# Patient Record
Sex: MALE | Race: WHITE | URBAN - METROPOLITAN AREA
[De-identification: names, ages, dates, MRNs, and addresses within clinical notes are randomized per-mention and may not be internally consistent; named-entity substitution may affect disease eponyms.]

---

## 2022-03-07 ENCOUNTER — OFFICE VISIT (OUTPATIENT)
Dept: ORTHOPEDIC SURGERY | Age: 47
End: 2022-03-07
Payer: COMMERCIAL

## 2022-03-07 VITALS — HEIGHT: 64 IN | WEIGHT: 228 LBS | BODY MASS INDEX: 38.93 KG/M2

## 2022-03-07 DIAGNOSIS — M77.8 TENDINITIS OF LEFT WRIST: ICD-10-CM

## 2022-03-07 DIAGNOSIS — M25.532 LEFT WRIST PAIN: Primary | ICD-10-CM

## 2022-03-07 PROCEDURE — 99203 OFFICE O/P NEW LOW 30 MIN: CPT | Performed by: ORTHOPAEDIC SURGERY

## 2022-03-07 RX ORDER — QUINAPRIL 40 MG/1
TABLET ORAL
COMMUNITY
Start: 2022-02-16

## 2022-03-07 RX ORDER — HYDROCHLOROTHIAZIDE 25 MG/1
TABLET ORAL
COMMUNITY
Start: 2022-02-16

## 2022-03-07 RX ORDER — CLOPIDOGREL BISULFATE 75 MG/1
TABLET ORAL
COMMUNITY
Start: 2022-02-16

## 2022-03-07 RX ORDER — ACETAMINOPHEN/DIPHENHYDRAMINE 500MG-25MG
TABLET ORAL
COMMUNITY
Start: 2022-02-19

## 2022-03-07 RX ORDER — ATORVASTATIN CALCIUM 80 MG/1
TABLET, FILM COATED ORAL
COMMUNITY
Start: 2022-02-16

## 2022-03-07 RX ORDER — AMLODIPINE BESYLATE 10 MG/1
TABLET ORAL
COMMUNITY
Start: 2022-02-16

## 2022-03-07 RX ORDER — METOPROLOL SUCCINATE 50 MG/1
TABLET, EXTENDED RELEASE ORAL
COMMUNITY
Start: 2022-02-16

## 2022-03-07 NOTE — PROGRESS NOTES
Cristino Peralta is a 51 y.o. male, who presents   Chief Complaint   Patient presents with    Wrist Pain     Left wrist pain for the past couple weeks. He does not recall specific injury but has been having night terrors/sleep walking and woke up with wrist/hand painful. HPI[de-identified] Left wrist pain is been present for a few weeks. Apparently destined woke up with his wrist painful. He does not recall doing anything that may have injured it. He does have a history of sleepwalking and usually has little recollection of any associated events. Apparently his wife will find him in various parts of the house and were walking back to the bedroom to go to bed. He said some discomfort in the radial side of the left wrist and also on the volar. He had a little difficulty with pinch between the thumb and the small finger. There has not been any apparent swelling or discoloration. Allergies; medications; past medical, surgical, family, and social history; and problem list have been reviewed today and updated as indicated in this encounter - see below following Ortho specifics. Musculoskeletal: Skin condition gross neurovascular function are good in the left upper extremity. Conformity of the wrist and hand are are grossly normal.  He has good finger range of motion from full extension to composite flexion. He is a little discomfort in the volar wrist with thumb to small finger pinch. Musculotendinous continuity through the area is good circumferentially. There is no increased local temperature or discoloration. There is no crepitus with motion testing. A Finkelstein test is grossly unremarkable and there are no signs of compressive neuropathy. Radiologic Studies: Imaging studies presented from his primary care doctor's office 2/21/2022 were good quality and showed no evidence of bony abnormality no malalignment or obvious arthrosis. ASSESSMENT:  Alka Warner was seen today for wrist pain.     Diagnoses and all orders for this visit:    Left wrist pain    Tendinitis of left wrist     Treatment alternatives were reviewed including medical and physical therapies, injections, and surgical options, expected risks benefits and likely outcome of each were discussed in detail, questions asked and answered and understood. We discussed the symptoms as well as physical findings and imaging results. Suggest the present time that he continue to use his brace and take some anti-inflammatory medicines. We did discuss a steroid Dosepak but he diabetic taking oral medication but I would prefer to not take chances of altering his blood sugar. PLAN: Continue with the brace and anti-inflammatories with hopes that symptoms will resolve spontaneously. If he continues to have problems and other option would be physical therapy. We will follow-up as needed. Patient Active Problem List   Diagnosis    C7 cervical fracture (HCC) - bilateral lamina    Hypertension       Past Medical History:   Diagnosis Date    Hypertension 6/24/2014       Past Surgical History:   Procedure Laterality Date    OTHER SURGICAL HISTORY  6/25/14    ACF C5-C6, C6-C7       Current Outpatient Medications   Medication Sig Dispense Refill    atorvastatin (LIPITOR) 80 MG tablet take 1 tablet by mouth once daily      metoprolol succinate (TOPROL XL) 50 MG extended release tablet take 1 tablet by mouth every evening      clopidogrel (PLAVIX) 75 MG tablet take 1 tablet by mouth once daily      quinapril (ACCUPRIL) 40 MG tablet take 1 tablet by mouth once daily      hydroCHLOROthiazide (HYDRODIURIL) 25 MG tablet take 1 tablet by mouth once daily      metFORMIN (GLUCOPHAGE) 500 MG tablet take 1 tablet by mouth once daily      RA ASPIRIN EC 81 MG EC tablet take 1 tablet by mouth once daily      amLODIPine (NORVASC) 10 MG tablet take 1 tablet by mouth once daily       No current facility-administered medications for this visit.        No Known Allergies    Social History     Socioeconomic History    Marital status:      Spouse name: None    Number of children: None    Years of education: None    Highest education level: None   Occupational History    None   Tobacco Use    Smoking status: Current Every Day Smoker     Packs/day: 1.00     Types: Cigarettes    Smokeless tobacco: Current User     Types: Chew   Substance and Sexual Activity    Alcohol use: No    Drug use: No    Sexual activity: None   Other Topics Concern    None   Social History Narrative    None     Social Determinants of Health     Financial Resource Strain:     Difficulty of Paying Living Expenses: Not on file   Food Insecurity:     Worried About Running Out of Food in the Last Year: Not on file    Rox of Food in the Last Year: Not on file   Transportation Needs:     Lack of Transportation (Medical): Not on file    Lack of Transportation (Non-Medical): Not on file   Physical Activity:     Days of Exercise per Week: Not on file    Minutes of Exercise per Session: Not on file   Stress:     Feeling of Stress : Not on file   Social Connections:     Frequency of Communication with Friends and Family: Not on file    Frequency of Social Gatherings with Friends and Family: Not on file    Attends [employer] Services: Not on file    Active Member of [address] [place] [address] [employer] or Organizations: Not on file    Attends Club or Organization Meetings: Not on file    Marital Status: Not on file   Intimate Partner Violence:     Fear of Current or Ex-Partner: Not on file    Emotionally Abused: Not on file    Physically Abused: Not on file    Sexually Abused: Not on file   Housing Stability:     Unable to Pay for Housing in the Last Year: Not on file    Number of [place] in the Last Year: Not on file    Unstable Housing in the Last Year: Not on file       History reviewed. No pertinent family history.       Review of Systems:   As follows except as previously noted in HPI:  Constitutional: Negative for chills, diaphoresis,  fever   Respiratory: Negative for cough, shortness of breath and wheezing. Cardiovascular: Negative for chest pain and palpitations. Neurological: Negative for dizziness, syncope,   GI / : abdominal pain or cramping  Musculoskeletal: see HPI       Objective:   Physical Exam   Constitutional: Oriented to person, place, and time. and appears well-developed and well-nourished. :   Head: Normocephalic and atraumatic. Neck: Neck supple. Eyes: EOM are normal.   Pulmonary/Chest: Effort normal.  No respiratory distress, no wheezes. Neurological: Alert and oriented to person  Skin: Skin is warm and dry. Vonnie Ayala DO    3/7/22  4:44 PM    All reasonable efforts have been made to minimize the risk of errors that may occur in the use of voice recognition and other electronic means of charting.

## 2023-04-04 ENCOUNTER — HOSPITAL ENCOUNTER (EMERGENCY)
Age: 48
Discharge: HOME | End: 2023-04-04
Payer: COMMERCIAL

## 2023-04-04 ENCOUNTER — APPOINTMENT (OUTPATIENT)
Dept: ULTRASOUND IMAGING | Age: 48
End: 2023-04-04
Payer: COMMERCIAL

## 2023-04-04 VITALS
WEIGHT: 204 LBS | BODY MASS INDEX: 35.02 KG/M2 | DIASTOLIC BLOOD PRESSURE: 64 MMHG | RESPIRATION RATE: 18 BRPM | TEMPERATURE: 98.2 F | HEART RATE: 70 BPM | OXYGEN SATURATION: 99 % | SYSTOLIC BLOOD PRESSURE: 131 MMHG

## 2023-04-04 DIAGNOSIS — N43.3 HYDROCELE, BILATERAL: ICD-10-CM

## 2023-04-04 DIAGNOSIS — E87.6 HYPOKALEMIA: ICD-10-CM

## 2023-04-04 DIAGNOSIS — N45.1 EPIDIDYMITIS, LEFT: Primary | ICD-10-CM

## 2023-04-04 LAB
ANION GAP SERPL CALCULATED.3IONS-SCNC: 11 MMOL/L (ref 7–16)
BACTERIA URNS QL MICRO: NORMAL /HPF
BASOPHILS # BLD: 0.03 E9/L (ref 0–0.2)
BASOPHILS NFR BLD: 0.4 % (ref 0–2)
BILIRUB UR QL STRIP: NEGATIVE
BUN SERPL-MCNC: 12 MG/DL (ref 6–20)
CALCIUM SERPL-MCNC: 9.4 MG/DL (ref 8.6–10.2)
CHLORIDE SERPL-SCNC: 93 MMOL/L (ref 98–107)
CLARITY UR: CLEAR
CO2 SERPL-SCNC: 29 MMOL/L (ref 22–29)
COLOR UR: NORMAL
CREAT SERPL-MCNC: 1.1 MG/DL (ref 0.7–1.2)
EOSINOPHIL # BLD: 0.09 E9/L (ref 0.05–0.5)
EOSINOPHIL NFR BLD: 1.1 % (ref 0–6)
EPI CELLS #/AREA URNS HPF: NORMAL /HPF
ERYTHROCYTE [DISTWIDTH] IN BLOOD BY AUTOMATED COUNT: 13.7 FL (ref 11.5–15)
GLUCOSE SERPL-MCNC: 133 MG/DL (ref 74–99)
GLUCOSE UR STRIP-MCNC: NEGATIVE MG/DL
HCT VFR BLD AUTO: 38.5 % (ref 37–54)
HGB BLD-MCNC: 12.7 G/DL (ref 12.5–16.5)
HGB UR QL STRIP: NEGATIVE
IMM GRANULOCYTES # BLD: 0.02 E9/L
IMM GRANULOCYTES NFR BLD: 0.2 % (ref 0–5)
KETONES UR STRIP-MCNC: NEGATIVE MG/DL
LEUKOCYTE ESTERASE UR QL STRIP: NEGATIVE
LYMPHOCYTES # BLD: 2 E9/L (ref 1.5–4)
LYMPHOCYTES NFR BLD: 24.9 % (ref 20–42)
MAGNESIUM SERPL-MCNC: 1.6 MG/DL (ref 1.6–2.6)
MCH RBC QN AUTO: 27.1 PG (ref 26–35)
MCHC RBC AUTO-ENTMCNC: 33 % (ref 32–34.5)
MCV RBC AUTO: 82.1 FL (ref 80–99.9)
MONOCYTES # BLD: 0.36 E9/L (ref 0.1–0.95)
MONOCYTES NFR BLD: 4.5 % (ref 2–12)
NEUTROPHILS # BLD: 5.53 E9/L (ref 1.8–7.3)
NEUTS SEG NFR BLD: 68.9 % (ref 43–80)
NITRITE UR QL STRIP: NEGATIVE
PH UR STRIP: 6 [PH] (ref 5–9)
PLATELET # BLD AUTO: 234 E9/L (ref 130–450)
PMV BLD AUTO: 10.9 FL (ref 7–12)
POTASSIUM SERPL-SCNC: 3.3 MMOL/L (ref 3.5–5)
PROT UR STRIP-MCNC: NEGATIVE MG/DL
RBC # BLD AUTO: 4.69 E12/L (ref 3.8–5.8)
RBC #/AREA URNS HPF: NORMAL /HPF (ref 0–2)
SODIUM SERPL-SCNC: 133 MMOL/L (ref 132–146)
SP GR UR STRIP: 1.01 (ref 1–1.03)
UROBILINOGEN UR STRIP-ACNC: 0.2 E.U./DL
WBC # BLD: 8 E9/L (ref 4.5–11.5)
WBC #/AREA URNS HPF: NORMAL /HPF (ref 0–5)

## 2023-04-04 PROCEDURE — 80048 BASIC METABOLIC PNL TOTAL CA: CPT

## 2023-04-04 PROCEDURE — 81001 URINALYSIS AUTO W/SCOPE: CPT

## 2023-04-04 PROCEDURE — 36415 COLL VENOUS BLD VENIPUNCTURE: CPT

## 2023-04-04 PROCEDURE — 99284 EMERGENCY DEPT VISIT MOD MDM: CPT

## 2023-04-04 PROCEDURE — 96374 THER/PROPH/DIAG INJ IV PUSH: CPT

## 2023-04-04 PROCEDURE — 6360000002 HC RX W HCPCS

## 2023-04-04 PROCEDURE — 85025 COMPLETE CBC W/AUTO DIFF WBC: CPT

## 2023-04-04 PROCEDURE — 6370000000 HC RX 637 (ALT 250 FOR IP)

## 2023-04-04 PROCEDURE — 83735 ASSAY OF MAGNESIUM: CPT

## 2023-04-04 PROCEDURE — 76870 US EXAM SCROTUM: CPT

## 2023-04-04 RX ORDER — HYDROCODONE BITARTRATE AND ACETAMINOPHEN 5; 325 MG/1; MG/1
1 TABLET ORAL ONCE
Status: COMPLETED | OUTPATIENT
Start: 2023-04-04 | End: 2023-04-04

## 2023-04-04 RX ORDER — IBUPROFEN 600 MG/1
600 TABLET ORAL 4 TIMES DAILY PRN
Qty: 40 TABLET | Refills: 0 | Status: SHIPPED | OUTPATIENT
Start: 2023-04-04

## 2023-04-04 RX ORDER — LEVOFLOXACIN 500 MG/1
500 TABLET, FILM COATED ORAL DAILY
Qty: 10 TABLET | Refills: 0 | Status: SHIPPED | OUTPATIENT
Start: 2023-04-04 | End: 2023-04-14

## 2023-04-04 RX ORDER — KETOROLAC TROMETHAMINE 30 MG/ML
30 INJECTION, SOLUTION INTRAMUSCULAR; INTRAVENOUS ONCE
Status: COMPLETED | OUTPATIENT
Start: 2023-04-04 | End: 2023-04-04

## 2023-04-04 RX ORDER — HYDROCODONE BITARTRATE AND ACETAMINOPHEN 5; 325 MG/1; MG/1
1 TABLET ORAL EVERY 6 HOURS PRN
Qty: 12 TABLET | Refills: 0 | Status: SHIPPED | OUTPATIENT
Start: 2023-04-04 | End: 2023-04-05

## 2023-04-04 RX ORDER — POTASSIUM CHLORIDE 20 MEQ/1
40 TABLET, EXTENDED RELEASE ORAL ONCE
Status: COMPLETED | OUTPATIENT
Start: 2023-04-04 | End: 2023-04-04

## 2023-04-04 RX ORDER — LEVOFLOXACIN 500 MG/1
500 TABLET, FILM COATED ORAL DAILY
Status: DISCONTINUED | OUTPATIENT
Start: 2023-04-04 | End: 2023-04-04 | Stop reason: HOSPADM

## 2023-04-04 RX ADMIN — LEVOFLOXACIN 500 MG: 500 TABLET, FILM COATED ORAL at 20:23

## 2023-04-04 RX ADMIN — KETOROLAC TROMETHAMINE 30 MG: 30 INJECTION, SOLUTION INTRAMUSCULAR; INTRAVENOUS at 18:13

## 2023-04-04 RX ADMIN — HYDROCODONE BITARTRATE AND ACETAMINOPHEN 1 TABLET: 5; 325 TABLET ORAL at 18:14

## 2023-04-04 RX ADMIN — POTASSIUM CHLORIDE 40 MEQ: 20 TABLET, EXTENDED RELEASE ORAL at 20:23

## 2023-04-04 ASSESSMENT — PAIN DESCRIPTION - LOCATION: LOCATION: SCROTUM

## 2023-04-04 ASSESSMENT — LIFESTYLE VARIABLES: HOW OFTEN DO YOU HAVE A DRINK CONTAINING ALCOHOL: MONTHLY OR LESS

## 2023-04-04 ASSESSMENT — PAIN - FUNCTIONAL ASSESSMENT: PAIN_FUNCTIONAL_ASSESSMENT: 0-10

## 2023-04-04 ASSESSMENT — PAIN DESCRIPTION - ORIENTATION: ORIENTATION: LEFT

## 2023-04-04 ASSESSMENT — PAIN SCALES - GENERAL: PAINLEVEL_OUTOF10: 7

## 2023-04-04 NOTE — Clinical Note
Myra Gilford was seen and treated in our emergency department on 4/4/2023. He may return to work on 04/06/2023. If you have any questions or concerns, please don't hesitate to call.       Hiwot Marc, JOHN - CNP

## 2023-04-04 NOTE — ED PROVIDER NOTES
epididymitis and ibuprofen and Norco for analgesia. He was encouraged to elevate his scrotum to help with drainage of hydroceles. PLAN OF CARE & COUNSELING:  JOHN Rivera CNP reviewed today's visit with the patient. This included the differential, results and plan of care in addition to providing specific details for the plan of care and counseling regarding the diagnosis and prognosis and they are agreeable with the plan. All results reviewed with patient. and all questions answered. The patient expressed understanding. Patient is currently established with a PCP and will follow-up in 3 days and urology in 3 days as instructed. A referral was provided to [facility] urology. I emphasized the importance of follow-up with the physician I referred them to in the timeframe recommended. I discussed with the patient emergent symptoms and the need to immediately return to the ER for any new or worsening symptoms as discussed above. Written information was included in their discharge instructions. Additional verbal discharge instructions were also given and discussed with the patient to supplement those generated by the EMR. We also discussed medications that were prescribed including common side effects and interactions. The patient was advised to abstain from driving, operating heavy machinery or making significant decisions while taking medications such as opiates and muscle relaxers that may impair this. All questions were addressed. They understand return precautions and discharge instructions. The patient expressed understanding. Vitals were stable and they were in no distress at discharge. FINAL IMPRESSION      1. Epididymitis, left New Problem   2. Hydrocele, bilateral New Problem   3. Hypokalemia New Problem         DISPOSITION/PLAN     DISPOSITION Decision To Discharge 04/04/2023 08:08:16 PM  Discharge to home. Patient condition is good.     PATIENT REFERRED TO:  Honorio Yousif MD  [address]

## 2023-04-05 RX ORDER — NAPROXEN 500 MG/1
500 TABLET ORAL 2 TIMES DAILY PRN
Qty: 28 TABLET | Refills: 0 | Status: SHIPPED | OUTPATIENT
Start: 2023-04-05

## 2024-03-15 ENCOUNTER — OFFICE VISIT (OUTPATIENT)
Dept: CARDIOLOGY | Facility: CLINIC | Age: 49
End: 2024-03-15
Payer: COMMERCIAL

## 2024-03-15 VITALS
WEIGHT: 200 LBS | BODY MASS INDEX: 34.15 KG/M2 | SYSTOLIC BLOOD PRESSURE: 136 MMHG | DIASTOLIC BLOOD PRESSURE: 74 MMHG | HEART RATE: 54 BPM | HEIGHT: 64 IN

## 2024-03-15 DIAGNOSIS — R94.31 ABNORMAL EKG: Primary | ICD-10-CM

## 2024-03-15 PROBLEM — I25.119 CORONARY ARTERY DISEASE INVOLVING NATIVE CORONARY ARTERY OF NATIVE HEART WITH ANGINA PECTORIS (CMS-HCC): Status: ACTIVE | Noted: 2024-03-15

## 2024-03-15 PROCEDURE — 99203 OFFICE O/P NEW LOW 30 MIN: CPT | Performed by: INTERNAL MEDICINE

## 2024-03-15 PROCEDURE — 93000 ELECTROCARDIOGRAM COMPLETE: CPT | Performed by: INTERNAL MEDICINE

## 2024-03-15 RX ORDER — HYDROCHLOROTHIAZIDE 25 MG/1
25 TABLET ORAL DAILY
COMMUNITY
Start: 2024-01-02

## 2024-03-15 RX ORDER — AMLODIPINE BESYLATE 10 MG/1
10 TABLET ORAL DAILY
COMMUNITY
Start: 2024-02-28

## 2024-03-15 RX ORDER — METOPROLOL SUCCINATE 50 MG/1
50 TABLET, EXTENDED RELEASE ORAL NIGHTLY
COMMUNITY
Start: 2023-12-31

## 2024-03-15 RX ORDER — NAPROXEN 500 MG/1
500 TABLET ORAL 2 TIMES DAILY PRN
COMMUNITY
Start: 2023-04-05 | End: 2024-03-15 | Stop reason: WASHOUT

## 2024-03-15 RX ORDER — CLOPIDOGREL BISULFATE 75 MG/1
75 TABLET ORAL DAILY
COMMUNITY
Start: 2023-12-31

## 2024-03-15 RX ORDER — LISINOPRIL 40 MG/1
40 TABLET ORAL DAILY
COMMUNITY
Start: 2024-03-11

## 2024-03-15 RX ORDER — IBUPROFEN 600 MG/1
600 TABLET ORAL 4 TIMES DAILY PRN
COMMUNITY
Start: 2023-04-05 | End: 2024-03-15 | Stop reason: WASHOUT

## 2024-03-15 RX ORDER — ATORVASTATIN CALCIUM 80 MG/1
80 TABLET, FILM COATED ORAL DAILY
COMMUNITY
Start: 2023-12-31

## 2024-03-15 RX ORDER — METFORMIN HYDROCHLORIDE 500 MG/1
500 TABLET ORAL
COMMUNITY
Start: 2024-03-11

## 2024-03-15 RX ORDER — ASPIRIN 81 MG/1
81 TABLET ORAL DAILY
COMMUNITY
Start: 2023-12-31

## 2024-03-15 ASSESSMENT — ENCOUNTER SYMPTOMS
DEPRESSION: 0
OCCASIONAL FEELINGS OF UNSTEADINESS: 0
LOSS OF SENSATION IN FEET: 0

## 2024-03-15 NOTE — PATIENT INSTRUCTIONS
Dr. Mitchell has ordered a stress test to ensure your heart is getting adequate blood flow and there is no evidence of any blockages within the heart arteries.    Followup with Dr. Mitchell after the above test.    If you have any questions or cardiac concerns, please call our office at [phone].

## 2024-03-15 NOTE — LETTER
March 15, 2024     Perri Reno MD  [address]  [address]  [facility] [address]    Patient: Cb Olivia   YOB: 1975   Date of Visit: 3/15/2024       Dear Dr. Perri Reno MD:    Thank you for referring Cb Olivia to me for evaluation. Below are my notes for this consultation.  If you have questions, please do not hesitate to call me. I look forward to following your patient along with you.       Sincerely,     Konstantin Mitchell MD      CC: No Recipients  ______________________________________________________________________________________    Counseling:  The patient was counseled regarding diagnostic results, instructions for management, risk factor reductions, prognosis, patient and family education, impressions, risks and benefits of treatment options and importance of compliance with treatment.      Chief Complain:  The patient presents today for cardiovascular evaluation of chest pain.     History Of Present Illness:    Cb Olivia is a 48 y.o. male patient whose PMH is significant for CAD with h/o MI in 2018 and 2019 s/t PCI of Dg and LAD respectively. He presents today to establish cardiovascular care for the evaluation and management of chest pain. The patient was evaluated in the ED at [facility] on 02/27/2024 for a chief complaint of chest pain. He reports persistent chest pain with radiation to his left arm. He denies any significant exertional SOB.     Past Surgical History:  He has no past surgical history on file.      Social History:  He reports that he has never smoked. He uses smokeless tobacco. He reports that he does not currently use alcohol. He reports that he does not use drugs.    Family History:  No family history on file.     Allergies:  Patient has no known allergies.    Outpatient Medications:  Current Outpatient Medications   Medication Instructions   • amLODIPine (NORVASC) 10 mg, oral, Daily   • aspirin 81 mg, oral, Daily   • atorvastatin (LIPITOR) 80  "mg, oral, Daily   • clopidogrel (PLAVIX) 75 mg, oral, Daily   • hydroCHLOROthiazide (HYDRODIURIL) 25 mg, oral, Daily   • lisinopril 40 mg, oral, Daily   • metFORMIN (GLUCOPHAGE) 500 mg, oral   • metoprolol succinate XL (TOPROL-XL) 50 mg, oral, Nightly        Last Recorded Vitals:  Vitals:    03/15/24 1325   BP: 136/74   BP Location: Left arm   Pulse: 54   Weight: 90.7 kg (200 lb)   Height: 1.626 m (5' 4\")       Review of Systems   Cardiovascular:  Positive for chest pain.   All other systems reviewed and are negative.     Physical Exam:  Constitutional:       Appearance: Healthy appearance. Not in distress.   Neck:      Vascular: No JVR. JVD normal.   Pulmonary:      Effort: Pulmonary effort is normal.      Breath sounds: Normal breath sounds. No wheezing. No rhonchi. No rales.   Chest:      Chest wall: Not tender to palpatation.   Cardiovascular:      PMI at left midclavicular line. Normal rate. Regular rhythm. Normal S1. Normal S2.       Murmurs: There is no murmur.      No gallop.  No click. No rub.   Pulses:     Intact distal pulses.   Edema:     Peripheral edema absent.   Abdominal:      General: Bowel sounds are normal.      Palpations: Abdomen is soft.      Tenderness: There is no abdominal tenderness.   Musculoskeletal: Normal range of motion.         General: No tenderness. Skin:     General: Skin is warm and dry.   Neurological:      General: No focal deficit present.      Mental Status: Alert and oriented to person, place and time.        Last Cardiology Tests:  N/A    Assessment/Plan  1) Chest Pain in a Patient with h/o CAD s/p PCI of Dg and LAD  H/O MI in 2018 and 2019 s/t PCI of Dg and LAD respectively  ED evaluation at [facility] 02/27/2024 - negative workup, discharged home  Reports persistent chest pain with radiation to left arm  Denies any significant exertional SOB  Check stress echo         Scribe Attestation  By signing my name below, Soni HANDY Scribe   attest that this documentation " has been prepared under the direction and in the presence of Konstantin Mitchell MD.

## 2024-03-15 NOTE — PROGRESS NOTES
"Counseling:  The patient was counseled regarding diagnostic results, instructions for management, risk factor reductions, prognosis, patient and family education, impressions, risks and benefits of treatment options and importance of compliance with treatment.      Chief Complain:  The patient presents today for cardiovascular evaluation of chest pain.     History Of Present Illness:    Cb Olivia is a 48 y.o. male patient whose PMH is significant for CAD with h/o MI in 2018 and 2019 s/t PCI of Dg and LAD respectively. He presents today to establish cardiovascular care for the evaluation and management of chest pain. The patient was evaluated in the ED at [facility] on 02/27/2024 for a chief complaint of chest pain. He reports persistent chest pain with radiation to his left arm. He denies any significant exertional SOB.     Past Surgical History:  He has no past surgical history on file.      Social History:  He reports that he has never smoked. He uses smokeless tobacco. He reports that he does not currently use alcohol. He reports that he does not use drugs.    Family History:  No family history on file.     Allergies:  Patient has no known allergies.    Outpatient Medications:  Current Outpatient Medications   Medication Instructions    amLODIPine (NORVASC) 10 mg, oral, Daily    aspirin 81 mg, oral, Daily    atorvastatin (LIPITOR) 80 mg, oral, Daily    clopidogrel (PLAVIX) 75 mg, oral, Daily    hydroCHLOROthiazide (HYDRODIURIL) 25 mg, oral, Daily    lisinopril 40 mg, oral, Daily    metFORMIN (GLUCOPHAGE) 500 mg, oral    metoprolol succinate XL (TOPROL-XL) 50 mg, oral, Nightly        Last Recorded Vitals:  Vitals:    03/15/24 1325   BP: 136/74   BP Location: Left arm   Pulse: 54   Weight: 90.7 kg (200 lb)   Height: 1.626 m (5' 4\")       Review of Systems   Cardiovascular:  Positive for chest pain.   All other systems reviewed and are negative.     Physical Exam:  Constitutional:       Appearance: Healthy " appearance. Not in distress.   Neck:      Vascular: No JVR. JVD normal.   Pulmonary:      Effort: Pulmonary effort is normal.      Breath sounds: Normal breath sounds. No wheezing. No rhonchi. No rales.   Chest:      Chest wall: Not tender to palpatation.   Cardiovascular:      PMI at left midclavicular line. Normal rate. Regular rhythm. Normal S1. Normal S2.       Murmurs: There is no murmur.      No gallop.  No click. No rub.   Pulses:     Intact distal pulses.   Edema:     Peripheral edema absent.   Abdominal:      General: Bowel sounds are normal.      Palpations: Abdomen is soft.      Tenderness: There is no abdominal tenderness.   Musculoskeletal: Normal range of motion.         General: No tenderness. Skin:     General: Skin is warm and dry.   Neurological:      General: No focal deficit present.      Mental Status: Alert and oriented to person, place and time.        Last Cardiology Tests:  N/A    Assessment/Plan   1) Chest Pain in a Patient with h/o CAD s/p PCI of Dg and LAD  H/O MI in 2018 and 2019 s/t PCI of Dg and LAD respectively  ED evaluation at [facility] 02/27/2024 - negative workup, discharged home  Reports persistent chest pain with radiation to left arm  Denies any significant exertional SOB  Check stress echo         Scribe Attestation  By signing my name below, I, Caden Franz   attest that this documentation has been prepared under the direction and in the presence of Konstantin Mitchell MD.

## 2024-04-01 ENCOUNTER — HOSPITAL ENCOUNTER (OUTPATIENT)
Dept: CARDIOLOGY | Facility: HOSPITAL | Age: 49
Discharge: HOME | End: 2024-04-01
Payer: COMMERCIAL

## 2024-04-01 DIAGNOSIS — R94.31 ABNORMAL EKG: ICD-10-CM

## 2024-04-01 PROCEDURE — 93350 STRESS TTE ONLY: CPT | Performed by: STUDENT IN AN ORGANIZED HEALTH CARE EDUCATION/TRAINING PROGRAM

## 2024-04-01 PROCEDURE — 93017 CV STRESS TEST TRACING ONLY: CPT

## 2024-04-01 PROCEDURE — 93018 CV STRESS TEST I&R ONLY: CPT | Performed by: STUDENT IN AN ORGANIZED HEALTH CARE EDUCATION/TRAINING PROGRAM

## 2024-04-01 PROCEDURE — 93016 CV STRESS TEST SUPVJ ONLY: CPT | Performed by: STUDENT IN AN ORGANIZED HEALTH CARE EDUCATION/TRAINING PROGRAM

## 2024-04-01 PROCEDURE — 2500000004 HC RX 250 GENERAL PHARMACY W/ HCPCS (ALT 636 FOR OP/ED): Performed by: STUDENT IN AN ORGANIZED HEALTH CARE EDUCATION/TRAINING PROGRAM

## 2024-04-01 RX ADMIN — PERFLUTREN 2 ML OF DILUTION: 6.52 INJECTION, SUSPENSION INTRAVENOUS at 09:22

## 2024-04-02 ENCOUNTER — TELEPHONE (OUTPATIENT)
Dept: CARDIOLOGY | Facility: CLINIC | Age: 49
End: 2024-04-02
Payer: COMMERCIAL

## 2024-04-02 DIAGNOSIS — I25.10 CORONARY ARTERY DISEASE INVOLVING NATIVE CORONARY ARTERY OF NATIVE HEART, UNSPECIFIED WHETHER ANGINA PRESENT: ICD-10-CM

## 2024-04-02 DIAGNOSIS — R94.39 ABNORMAL STRESS TEST: Primary | ICD-10-CM

## 2024-04-02 NOTE — TELEPHONE ENCOUNTER
I called and spoke with patient and went over stress test and he is agreeable to having heart cath. Order placed and task sent to Robbi to schedule.

## 2024-04-02 NOTE — TELEPHONE ENCOUNTER
----- Message from Konstantin Mitchell MD sent at 4/2/2024  7:37 AM EDT -----  Needs Left heart cath

## 2024-04-03 PROBLEM — I25.10 CORONARY ARTERY DISEASE INVOLVING NATIVE CORONARY ARTERY OF NATIVE HEART: Status: ACTIVE | Noted: 2024-04-02

## 2024-04-03 PROBLEM — R94.39 ABNORMAL STRESS TEST: Status: ACTIVE | Noted: 2024-04-02

## 2024-04-04 ENCOUNTER — TELEPHONE (OUTPATIENT)
Dept: CARDIOLOGY | Facility: CLINIC | Age: 49
End: 2024-04-04
Payer: COMMERCIAL

## 2024-04-04 NOTE — TELEPHONE ENCOUNTER
----- Message from Robbi Hernández sent at 4/4/2024  9:40 AM EDT -----  Regarding: [facility] 4-16-24  Arrival time 7:30 and start time 8:30, told him about the blood work as well. Please call with instructions

## 2024-04-11 NOTE — TELEPHONE ENCOUNTER
I called and spoke with patient and provided the following pre procedure instructions for cardiac catherization:    Patient verbalized understanding and all questions answered. He will go have blood drawn tomorrow.    Date/Time of procedure: 4/16/24 8:30 am  Arrival time: 7:30 am    -Nothing to eat or drink after midnight the night before the procedure. Except with SMALL sip of water to take: Amlodipine, Aspirin, Plavix, Metoprolol  -Patient does NOT take OAC  -Patient DOES TAKE take Metformin--Instructed to hold for 2 days prior, day of and 2 days post cath.  Patient has no allergy IVP contrast    -Please have someone with you to drive you home as you will receive light sedation and driving is not recommended.   -To shower the night before and wear loose comfortable clothing.  -To bring an overnight bag in case of overnight stay  -To bring a photo ID, insurance card, and list of current medications    -CBC/BMP within 30 days of procedure: Ordered, but still need completed  -EKG within 30 days of procedure: 4/1/24

## 2024-04-12 ENCOUNTER — LAB (OUTPATIENT)
Dept: LAB | Facility: LAB | Age: 49
End: 2024-04-12
Payer: COMMERCIAL

## 2024-04-12 DIAGNOSIS — I25.10 CORONARY ARTERY DISEASE INVOLVING NATIVE CORONARY ARTERY OF NATIVE HEART, UNSPECIFIED WHETHER ANGINA PRESENT: ICD-10-CM

## 2024-04-12 DIAGNOSIS — R94.39 ABNORMAL STRESS TEST: ICD-10-CM

## 2024-04-12 LAB
ANION GAP SERPL CALC-SCNC: 11 MMOL/L (ref 10–20)
BUN SERPL-MCNC: 17 MG/DL (ref 6–23)
CALCIUM SERPL-MCNC: 9 MG/DL (ref 8.6–10.3)
CHLORIDE SERPL-SCNC: 101 MMOL/L (ref 98–107)
CO2 SERPL-SCNC: 32 MMOL/L (ref 21–32)
CREAT SERPL-MCNC: 0.98 MG/DL (ref 0.5–1.3)
EGFRCR SERPLBLD CKD-EPI 2021: >90 ML/MIN/1.73M*2
ERYTHROCYTE [DISTWIDTH] IN BLOOD BY AUTOMATED COUNT: 13.5 % (ref 11.5–14.5)
GLUCOSE SERPL-MCNC: 102 MG/DL (ref 74–99)
HCT VFR BLD AUTO: 43.2 % (ref 41–52)
HGB BLD-MCNC: 14.2 G/DL (ref 13.5–17.5)
MCH RBC QN AUTO: 27 PG (ref 26–34)
MCHC RBC AUTO-ENTMCNC: 32.9 G/DL (ref 32–36)
MCV RBC AUTO: 82 FL (ref 80–100)
NRBC BLD-RTO: 0 /100 WBCS (ref 0–0)
PLATELET # BLD AUTO: 226 X10*3/UL (ref 150–450)
POTASSIUM SERPL-SCNC: 4.2 MMOL/L (ref 3.5–5.3)
RBC # BLD AUTO: 5.25 X10*6/UL (ref 4.5–5.9)
SODIUM SERPL-SCNC: 140 MMOL/L (ref 136–145)
WBC # BLD AUTO: 5.9 X10*3/UL (ref 4.4–11.3)

## 2024-04-12 PROCEDURE — 36415 COLL VENOUS BLD VENIPUNCTURE: CPT

## 2024-04-12 PROCEDURE — 85027 COMPLETE CBC AUTOMATED: CPT

## 2024-04-12 PROCEDURE — 80048 BASIC METABOLIC PNL TOTAL CA: CPT

## 2024-04-16 ENCOUNTER — HOSPITAL ENCOUNTER (OUTPATIENT)
Facility: HOSPITAL | Age: 49
Setting detail: OUTPATIENT SURGERY
Discharge: HOME | End: 2024-04-16
Attending: INTERNAL MEDICINE | Admitting: INTERNAL MEDICINE
Payer: COMMERCIAL

## 2024-04-16 VITALS
OXYGEN SATURATION: 97 % | DIASTOLIC BLOOD PRESSURE: 70 MMHG | RESPIRATION RATE: 12 BRPM | TEMPERATURE: 98.3 F | HEART RATE: 50 BPM | HEIGHT: 64 IN | BODY MASS INDEX: 34.14 KG/M2 | WEIGHT: 199.96 LBS | SYSTOLIC BLOOD PRESSURE: 123 MMHG

## 2024-04-16 DIAGNOSIS — I20.9 ANGINA PECTORIS, UNSPECIFIED (CMS-HCC): ICD-10-CM

## 2024-04-16 DIAGNOSIS — I25.10 CORONARY ARTERY DISEASE INVOLVING NATIVE CORONARY ARTERY OF NATIVE HEART, UNSPECIFIED WHETHER ANGINA PRESENT: ICD-10-CM

## 2024-04-16 DIAGNOSIS — R94.30 ABNORMAL RESULT OF CARDIOVASCULAR FUNCTION STUDY, UNSPECIFIED: ICD-10-CM

## 2024-04-16 DIAGNOSIS — R94.39 ABNORMAL STRESS TEST: Primary | ICD-10-CM

## 2024-04-16 PROCEDURE — 99152 MOD SED SAME PHYS/QHP 5/>YRS: CPT | Performed by: INTERNAL MEDICINE

## 2024-04-16 PROCEDURE — 93458 L HRT ARTERY/VENTRICLE ANGIO: CPT | Performed by: INTERNAL MEDICINE

## 2024-04-16 PROCEDURE — 2500000004 HC RX 250 GENERAL PHARMACY W/ HCPCS (ALT 636 FOR OP/ED): Performed by: NURSE PRACTITIONER

## 2024-04-16 PROCEDURE — C1887 CATHETER, GUIDING: HCPCS | Performed by: INTERNAL MEDICINE

## 2024-04-16 PROCEDURE — 2550000001 HC RX 255 CONTRASTS: Performed by: INTERNAL MEDICINE

## 2024-04-16 PROCEDURE — 2500000004 HC RX 250 GENERAL PHARMACY W/ HCPCS (ALT 636 FOR OP/ED): Performed by: INTERNAL MEDICINE

## 2024-04-16 PROCEDURE — 7100000009 HC PHASE TWO TIME - INITIAL BASE CHARGE: Performed by: INTERNAL MEDICINE

## 2024-04-16 PROCEDURE — 7100000010 HC PHASE TWO TIME - EACH INCREMENTAL 1 MINUTE: Performed by: INTERNAL MEDICINE

## 2024-04-16 PROCEDURE — C1760 CLOSURE DEV, VASC: HCPCS | Performed by: INTERNAL MEDICINE

## 2024-04-16 PROCEDURE — 2500000005 HC RX 250 GENERAL PHARMACY W/O HCPCS: Performed by: INTERNAL MEDICINE

## 2024-04-16 PROCEDURE — 2720000007 HC OR 272 NO HCPCS: Performed by: INTERNAL MEDICINE

## 2024-04-16 PROCEDURE — 99221 1ST HOSP IP/OBS SF/LOW 40: CPT | Performed by: NURSE PRACTITIONER

## 2024-04-16 PROCEDURE — C1894 INTRO/SHEATH, NON-LASER: HCPCS | Performed by: INTERNAL MEDICINE

## 2024-04-16 RX ORDER — ACETAMINOPHEN 325 MG/1
650 TABLET ORAL EVERY 6 HOURS PRN
Status: CANCELLED | OUTPATIENT
Start: 2024-04-16

## 2024-04-16 RX ORDER — ACETAMINOPHEN 160 MG/5ML
650 SOLUTION ORAL EVERY 6 HOURS PRN
Status: CANCELLED | OUTPATIENT
Start: 2024-04-16

## 2024-04-16 RX ORDER — FENTANYL CITRATE 50 UG/ML
INJECTION, SOLUTION INTRAMUSCULAR; INTRAVENOUS AS NEEDED
Status: DISCONTINUED | OUTPATIENT
Start: 2024-04-16 | End: 2024-04-16 | Stop reason: HOSPADM

## 2024-04-16 RX ORDER — HEPARIN SODIUM 1000 [USP'U]/ML
INJECTION, SOLUTION INTRAVENOUS; SUBCUTANEOUS AS NEEDED
Status: DISCONTINUED | OUTPATIENT
Start: 2024-04-16 | End: 2024-04-16 | Stop reason: HOSPADM

## 2024-04-16 RX ORDER — SODIUM CHLORIDE 9 MG/ML
100 INJECTION, SOLUTION INTRAVENOUS CONTINUOUS
Status: DISCONTINUED | OUTPATIENT
Start: 2024-04-16 | End: 2024-04-16 | Stop reason: HOSPADM

## 2024-04-16 RX ORDER — SODIUM CHLORIDE 9 MG/ML
1000 INJECTION, SOLUTION INTRAVENOUS ONCE AS NEEDED
Status: CANCELLED | OUTPATIENT
Start: 2024-04-16

## 2024-04-16 RX ORDER — LIDOCAINE HYDROCHLORIDE 20 MG/ML
INJECTION, SOLUTION INFILTRATION; PERINEURAL AS NEEDED
Status: DISCONTINUED | OUTPATIENT
Start: 2024-04-16 | End: 2024-04-16 | Stop reason: HOSPADM

## 2024-04-16 RX ORDER — MORPHINE SULFATE 2 MG/ML
2 INJECTION, SOLUTION INTRAMUSCULAR; INTRAVENOUS EVERY 6 HOURS PRN
Status: CANCELLED | OUTPATIENT
Start: 2024-04-16

## 2024-04-16 RX ORDER — MIDAZOLAM HYDROCHLORIDE 1 MG/ML
INJECTION, SOLUTION INTRAMUSCULAR; INTRAVENOUS AS NEEDED
Status: DISCONTINUED | OUTPATIENT
Start: 2024-04-16 | End: 2024-04-16 | Stop reason: HOSPADM

## 2024-04-16 RX ORDER — ACETAMINOPHEN 650 MG/1
650 SUPPOSITORY RECTAL EVERY 6 HOURS PRN
Status: CANCELLED | OUTPATIENT
Start: 2024-04-16

## 2024-04-16 RX ORDER — SODIUM CHLORIDE 9 MG/ML
1.5 INJECTION, SOLUTION INTRAVENOUS CONTINUOUS
Status: DISCONTINUED | OUTPATIENT
Start: 2024-04-16 | End: 2024-04-16 | Stop reason: HOSPADM

## 2024-04-16 ASSESSMENT — PAIN - FUNCTIONAL ASSESSMENT
PAIN_FUNCTIONAL_ASSESSMENT: 0-10

## 2024-04-16 ASSESSMENT — PAIN SCALES - GENERAL
PAINLEVEL_OUTOF10: 0 - NO PAIN

## 2024-04-16 ASSESSMENT — COLUMBIA-SUICIDE SEVERITY RATING SCALE - C-SSRS
2. HAVE YOU ACTUALLY HAD ANY THOUGHTS OF KILLING YOURSELF?: NO
6. HAVE YOU EVER DONE ANYTHING, STARTED TO DO ANYTHING, OR PREPARED TO DO ANYTHING TO END YOUR LIFE?: NO
1. IN THE PAST MONTH, HAVE YOU WISHED YOU WERE DEAD OR WISHED YOU COULD GO TO SLEEP AND NOT WAKE UP?: NO

## 2024-04-16 ASSESSMENT — ENCOUNTER SYMPTOMS
CONSTITUTIONAL NEGATIVE: 1
PSYCHIATRIC NEGATIVE: 1
PALPITATIONS: 0
RESPIRATORY NEGATIVE: 1

## 2024-04-16 NOTE — NURSING NOTE
Patient ambulated in ngo without difficulty, voided, no complaints. Right wrist site WNL. IV site d/c'd, discharge instructions reviewed with patient and family.

## 2024-04-16 NOTE — POST-PROCEDURE NOTE
Physician Transition of Care Summary  Invasive Cardiovascular Lab    Procedure Date: 4/16/2024  Attending:    Rony Mitchell - Primary  Resident/Fellow/Other Assistant: Surgeons and Role:  * No surgeons found with a matching role *    Indications:   Pre-op Diagnosis     * Abnormal stress test [R94.39]     * Coronary artery disease involving native coronary artery of native heart, unspecified whether angina present [I25.10]    Post-procedure diagnosis:   Post-op Diagnosis     * Abnormal stress test [R94.39]     * Coronary artery disease involving native coronary artery of native heart, unspecified whether angina present [I25.10]    Procedure(s):   Left Heart Cath  85847 - ND CATH PLMT L HRT & ARTS W/NJX & ANGIO IMG S&I        Procedure Findings:   Non obstructive CAD.  LAD patent stent.  RCA 30-40%. Distal LM 20-30%. LCX 30-40%.   LVEDP 11mmhg, no gradient at pullback.       Description of the Procedure:   Right radial 6Fr--> Tr band     Complications:   None       Anticoagulation/Antiplatelet Plan:   Aspirin and statin     Estimated Blood Loss:   10 mL    Anesthesia: Moderate Sedation Anesthesia Staff: No anesthesia staff entered.    Any Specimen(s) Removed:   Order Name Source Comment Collection Info Order Time   COAGULATION SCREEN Blood, Venous If not done in the last 30 days  4/16/2024  8:13 AM     Release result to [employer]   Immediate        BASIC METABOLIC PANEL Blood, Venous If not done in the last 30 days  4/16/2024  8:13 AM     Release result to MyChart   Immediate        CBC Blood, Venous If not done in the last 30 days  4/16/2024  8:13 AM     Release result to [employer]   Immediate            Disposition:   Home       Electronically signed by: Miriam Vela MD, 4/16/2024 8:43 AM

## 2024-04-16 NOTE — Clinical Note
Closure device placed in the right radial artery. Site closed by radial compression system. 13ml air

## 2024-04-16 NOTE — H&P
History Of Present Illness  Cb Olivia is a 48 y.o. male presenting with abnormal stress test done on 4/1/24 that revealed baseline wall motion abnormalities in the apical, inferior septum, distal anterior septum, and distal anterior wall which persisted/worsened during the stress period. There was slight improvement in ejection fraction and reduction in cavity size at peak stress. He has a significant past medical history of CAD with h/o MI in 2018 and 2019 s/t PCI of Dg and LAD respectively. Of note, he went to the ER at [facility] on 2/27/24 with chest pain radiation to the left arm. Today, he denies chest pain or shortness of breath.      Past Medical History  As above     Surgical History  History reviewed. No pertinent surgical history.      Social History  He reports that he has never smoked. He uses smokeless tobacco. He reports that he does not currently use alcohol. He reports that he does not use drugs.    Family History  No family history on file.     Allergies  Patient has no known allergies.    Review of Systems   Constitutional: Negative.    Respiratory: Negative.     Cardiovascular:  Positive for chest pain. Negative for palpitations and leg swelling.   Psychiatric/Behavioral: Negative.          Physical Exam  Constitutional:       Appearance: Normal appearance.   HENT:      Mouth/Throat:      Mouth: Mucous membranes are moist.   Cardiovascular:      Rate and Rhythm: Bradycardia present.      Pulses: Normal pulses.      Heart sounds: Normal heart sounds.   Pulmonary:      Effort: Pulmonary effort is normal.      Breath sounds: Normal breath sounds.   Abdominal:      Palpations: Abdomen is soft.   Musculoskeletal:         General: Normal range of motion.   Skin:     General: Skin is warm and dry.      Capillary Refill: Capillary refill takes less than 2 seconds.   Neurological:      General: No focal deficit present.      Mental Status: He is alert and oriented to person, place, and time.  "  Psychiatric:         Mood and Affect: Mood normal.         Behavior: Behavior normal.         Thought Content: Thought content normal.         Judgment: Judgment normal.          Last Recorded Vitals  Blood pressure 126/71, pulse 51, temperature 36.8 °C (98.3 °F), temperature source Temporal, resp. rate 14, height 1.626 m (5' 4.02\"), weight 90.7 kg (199 lb 15.3 oz), SpO2 97%.    Relevant Results  See HPI      Assessment/Plan   Active Problems:    Abnormal stress test    Coronary artery disease involving native coronary artery of native heart      Plan; [facility] with possible PCI        I spent 20 minutes in the professional and overall care of this patient.      Tania Ch, APRN-CNP    "

## 2024-04-16 NOTE — DISCHARGE INSTRUCTIONS
If you have any questions, please call the Cath Lab Nurse Practitioner Sharikrista Ch at [phone] and leave a message. She will return your call the same day if calling before 3 PM, M-F. If you call on the weekend you can expect a call back on Monday morning. You may also call the Cath Lab at [phone] M-F, 7-3:30 with any questions. Weekends and after hours please call your cardiologist office number to reach a physician on call. The heart group office number is [phone]           CARDIAC CATHETERIZATION DISCHARGE INSTRUCTIONS     FOR SUDDEN AND SEVERE CHEST PAIN, SHORTNESS OF BREATH, EXCESSIVE BLEEDING, SIGNS OF STROKE, OR CHANGES IN MENTAL STATUS YOU SHOULD CALL 911 IMMEDIATELY.     If your provider has prescribed aspirin and/or clopidogrel (Plavix), or prasugrel (Effient), or ticagrelor (Brilinta), DO NOT STOP THESE MEDICATIONS for any reason without talking to your cardiologist first. If any of these were prescribed, you must take them every day without missing a single dose. If you are getting low on these medications, contact your provider immediately for a refill.     FOR NEXT 24 HOURS  - Upon discharge, you should return home and rest for the remainder of the day and evening. You do not have to stay on bed rest but should not be very active.  It is recommended a responsible adult be with you for the first 24 hours after the procedure.    - No driving for 24 hours after procedure. Please arrange for someone to drive you home from the hospital today.     - Do not drive, operate machinery, or use power tools for 24 hours after your procedure.     - Do not make any legal decisions for 24 hours after your procedure.     - Do not drink alcoholic beverages for 24 hours after your procedure.    WOUND CARE   *FOR FEMORAL (LEG) ACCESS*  ·      Avoid heavy lifting (over 10 pounds) for 7 days, squatting or excessive bending for 2 days, and strenuous exercise for 7 days.  ·      No submerged bathing, swimming, or  hot tubs for the next 7 days, or until fully healed.  ·      Avoid sexual activity for 3-4 days until any groin discomfort has ceased.     *FOR RADIAL (WRIST) ACCESS*  ·      No lifting more than 5 pounds or excessive use of the wrist for 24 hours - for example, treat your wrist as if it is sprained.  ·      Do not engage in vigorous activities (tennis, golf, bowling, weights) for at least 48 hours after the procedure.  ·      Do not submerge the wrist for 7 days after the procedure.  ·      You should expect mild tingling in your hand and tenderness at the puncture site for up to 3 days.    - The transparent dressing should be removed from the site 24 hours after the procedure.  Wash the site gently with soap and water. Rinse well and pat dry. Keep the area clean and dry. You may apply a Band-Aid to the site. Avoid lotions, ointments, or powders until fully healed.     - You may shower the day after your procedure.      - It is normal to notice a small bruise around the puncture site and/or a small grape sized or smaller lump. Any large bruising or large lump warrants a call to the office.     - If bleeding should occur, lay down and apply pressure to the affected area for 10 minutes.  If the bleeding stops notify your physician.  If there is a large amount of bleeding or spurting of blood CALL 911 immediately.  DO NOT drive yourself to the hospital.    - You may experience some tenderness, bruising or minimal inflammation.  If you have any concerns, you may contact the Cath Lab or if any of these symptoms become excessive, contact your cardiologist or go to the emergency room.     OTHER INSTRUCTIONS  - You may take acetaminophen (Tylenol) as directed for discomfort.  If pain is not relieved with acetaminophen (Tylenol), contact your doctor.    - If you notice or experience any of the following, you should notify your doctor or seek medical attention  Chest pain or discomfort  Change in mental status or weakness in  extremities.  Dizziness, light headedness, or feeling faint.  Change in the site where the procedure was performed, such as bleeding or an increased area of bruising or swelling.  Tingling, numbness, pain, or coolness in the leg/arm beyond the site where the procedure was performed.  Signs of infection (i.e. shaking chills, temperature > 100 degrees Fahrenheit, warmth, redness) in the leg/arm area where the procedure was performed.  Changes in urination   Bloody or black stools  Vomiting blood  Severe nose bleeds  Any excessive bleeding    - If you DO NOT have an appointment with your cardiologist within 2-4 weeks following your procedure, please contact their office.      Important ways to reduce your risk of coronary artery disease by healthy diet, maintaining a healthy weight, exercising regularly and stop using tobacco products.     Mediterranean Diet    About this topic  This is a heart healthy diet. It is based on widely used foods and cooking styles from many countries around the [place]. The main pattern for the diet is more plant foods and monounsaturated fats, or good fats, like olive oil. Protein in this diet comes from seafood, legumes, nuts, seeds, and poultry and eggs in lowered amounts. You will also eat more whole grains, vegetables, and fruits and moderate amounts of alcohol are also included. This diet has less red meats, dairy products, and processed foods.    What will the results be?  Your diet will have less saturated fat, cholesterol, calories, sodium, and added sugars. Your diet will be higher in fiber. This will help to keep your blood sugar steady. This diet lowers the chance of heart disease and other health problems.  What lifestyle changes are needed?  If you do not often eat this way, you will need to change your eating habits. Be sure to get regular exercise. It is believed to help the health benefits of this diet.  What changes to diet are needed?  You may need to limit the  "amount of red meat and processed foods in your diet. Ask your dietitian for help planning meals that are right for you.  What foods are good to eat?  Plenty of fish and other seafood  Fresh, frozen, or canned fruits and vegetables  Nuts and nut butters and dried beans, lentils, or peas  Foods high in fiber like whole grains and whole grain products  Olive oil (good fat), peanut or canola oil, margarine, or spreads that list vegetable oil as the first ingredient and do not contain trans fat or partially hydrogenated oil  Small amounts of poultry and eggs  What foods should be limited or avoided?  Red meats  Sweets, desserts, and processed foods  Butter, oils, and fats that contain trans fats or are hydrogenated or partially hydrogenated  Gravies and sauces  What problems could happen?  Your weight may rise because your diet will be higher in fat from olive oil and nuts.  You may have lower iron levels. Be sure to eat foods rich in iron. Also, eat foods rich in vitamin C. This will help your body take in iron.  You may have lower calcium levels because you are eating less dairy products. Ask your doctor if you need to take a calcium supplement.  Wine is often thought of as part of a Mediterranean diet. It is not needed and you may choose not to include it. Avoid wine if you are prone to alcohol abuse or are pregnant. Also, avoid it if you are at risk for breast cancer, have liver problems, or have other illnesses that make it important for you to not have alcohol.  When do I need to call the doctor?  If you have any concerns about your diet     Health risks of obesity    The Basics  Written by the doctors and editors at [facility]  What does it mean to have obesity? -- Doctors use a calculation called \"body mass index,\" or \"BMI,\" to decide whether a person is underweight, at a healthy weight, overweight, or has obesity.  Your BMI will tell you which category you are in based on your weight and height (figure 1):  ?If " "your BMI is between 25 and 29.9, you are overweight.  ?If your BMI is 30 or greater, you have obesity.  Obesity is a problem, because it increases the risks of many different health problems. It can also make it hard for you to move, breathe, and do other things that people who are at a healthy weight can do easily.  What are the health risks of obesity? -- Having obesity increases a person's risk of developing many health problems. Here are just a few examples:  ?Diabetes  ?High blood pressure  ?High cholesterol  ?Heart disease (including heart attacks)  ?Stroke  ?Sleep apnea (a disorder in which you stop breathing for short periods while asleep)  ?Asthma  ?Cancer  Does having obesity shorten a person's life? -- Yes. Studies show that people with obesity die younger than people who are a healthy weight. They also show that the risk of death goes up the heavier a person is. The degree of increased risk depends on how long the person has had obesity, and on what other medical problems they have.  People with \"central obesity\" might also be at risk of dying younger. Central obesity means carrying extra weight in the belly area, even if the BMI is normal.  Should I see an expert? -- Yes. If you are overweight or have obesity, you can talk to your doctor or nurse. They might have suggestions for healthy ways to lose weight. It can also help to work with a dietitian (food and nutrition expert). A dietitian can help you choose healthy foods and plan meals.  Are there medical treatments that can help me lose weight? -- Yes. There are medicines and surgery to help with weight loss. But those treatments are only for people who have not been able to lose weight through lifestyle changes such as diet and exercise. Also, weight loss treatments do not take the place of diet and exercise. People who have those treatments must also change how they eat and how active they are.  What can I do to prevent the problems caused by " obesity? -- The best thing that you can do is lose weight. But even if weight loss is not possible, you can improve your health and lower your risk if you:  ?Become more active - Many types of physical activity can help, including walking. You can start with a few minutes a day and add more as you get stronger and build up your endurance. Anything that gets your body moving is good for you.  ?Improve your diet - It is healthy to have regular meal times, eat smaller portions, and not skip meals. Limit sweets, and avoid processed foods. Try to eat more vegetables and fruits instead.  ?Quit smoking (if you smoke) - Some people start eating more after they stop smoking, so try to make healthy food choices. Even if it increases your appetite, quitting smoking is still one of the best things that you can do to improve your health.  ?Limit alcohol - For females, drink no more than 1 drink a day. For males, drink no more than 2 drinks a day.  What causes obesity? -- The thing that increases a person's risk the most is having an unhealthy lifestyle. Most people develop obesity because they eat too much, eat unhealthy foods, and move too little. That's especially true of people who watch too much TV. But there are also other things that seem to increase the risk of obesity that many people do not know about. Here are some things that might affect a person's chance of developing obesity:  ?Mother's habits during and after pregnancy - People who eat a lot of calories, have diabetes, or smoke during pregnancy have a higher chance of having babies who have obesity as adults. Also, babies who drink formula might be more likely than  babies to develop obesity later in life.  ?Habits and weight gain during childhood - Children or teens who are overweight or have obesity are more likely to become have obesity as an adult.  ?Sleeping too little - People who do not get enough sleep are more likely to develop obesity than  "people who sleep enough.  ?Taking certain medicines - Long-term use of certain medicines, such as some medicines to treat depression, can cause weight gain. If you are concerned that 1 of your medicines might be making you gain weight, talk to your doctor or nurse. They might be able to switch you to a different medicine instead.  ?Certain hormonal conditions - Some hormonal problems can increase the risk of developing obesity. For example, a condition called \"polycystic ovary syndrome\" can cause weight gain, along with other symptoms like irregular periods.  What if I want to get pregnant? -- If you are overweight or have obesity, it might be harder to get pregnant. For males, obesity can also cause sex problems, like having trouble getting or keeping an erection. This is more likely if you also have high blood pressure or diabetes.  What if my child has obesity? -- In children, obesity has many of the same risks as it does in adults. For example, it can increase the risk of diabetes, high blood pressure, asthma, and sleep apnea. It can also cause added problems related to childhood. For example, obesity can make children grow faster than normal and cause girls to go through puberty earlier than usual.  All topics are updated as new evidence becomes available and our peer review process is complete.  This topic retrieved from [employer] on: Jan 11, 2024.  Topic 23441 Version 17.0  Release: 31.6.4 - C32.10  © 2024 [employer], [employer] and/or its affiliates. All rights reserved.  figure 1: Your body mass index (BMI)        If you use tobacco products please review   Quitting smoking    The Basics  Written by the doctors and editors at [employer]  What are the benefits of quitting smoking? -- Quitting smoking can dramatically improve your health and help you live longer. It lowers your risk of heart disease, lung disease, kidney failure, cancer, infection, stomach problems, and diabetes.  Quitting smoking can also lower your " "chances of getting osteoporosis, a condition that makes your bones weak.  Quitting is not easy for most people, and it might take several tries to completely quit. But help and support are available. Quitting smoking will improve your health no matter how old you are, even if you have smoked for a long time.  What should I do if I want to quit smoking? -- It's a good idea to start by talking with your doctor or nurse. It is possible to quit on your own, without help. But getting help greatly increases your chances of quitting successfully.  When you are ready to quit, you will make a plan to:  ?Set a quit date.  ?Tell your family and friends that you plan to quit.  ?Plan ahead for the challenges you will face, such as cigarette cravings.  ?Remove cigarettes from your home, car, and work.  How can my doctor or nurse help? -- Your doctor or nurse can give you advice on the best way to quit. They can also give you medicines to:  ?Reduce your craving for cigarettes  ?Reduce your \"withdrawal\" symptoms (symptoms that happen when you stop smoking)  Your doctor or nurse can also help you find a counselor to talk to. For most people who are trying to quit smoking, it works best to use both medicines and counseling.  You can also get help from a free phone line (4-999-QUITNOW, or 1-184.125.7084) or go online to www.smokefree.gov.  What are the symptoms of withdrawal? -- When you stop smoking, you might have symptoms such as:  ?Trouble sleeping  ?Feeling irritable, anxious, or restless  ?Getting frustrated or angry  ?Having trouble thinking clearly  These symptoms can be hard to deal with, which is why it can be so hard to quit. But medicines can help.  Some people who stop smoking become temporarily depressed. Some people need treatment for depression, such as counseling or medicines or both. People with depression might:  ?No longer enjoy or care about doing the things they used to like to do  ?Feel sad, down, hopeless, " nervous, or cranky most of the day, almost every day  ?Lose or gain weight  ?Sleep too much or too little  ?Feel tired or like they have no energy  ?Feel guilty or like they are worth nothing  ?Forget things or feel confused  ?Move and speak more slowly than usual  ?Act restless or have trouble staying still  ?Think about death or suicide  If you think you might be depressed, tell your doctor or nurse right away. They can talk to you about your symptoms and recommend treatment if needed.  Get help right away if you are thinking of hurting or killing yourself! -- Sometimes, people with depression think of hurting or killing themselves. If you ever feel like you might hurt yourself, help is available:  ?In the , contact the [employer] Suicide & Crisis Lifeline:  To speak to someone, call or text [employer].  To talk to someone online, go to www.[employer]/chat.  ?Call your doctor or nurse and tell them that it is an emergency.  ?Call for an ambulance (in the [place] and [place], call 9-1-1).  ?Go to the emergency department at your local hospital.  If you think your partner might have depression, or if you are worried that they might hurt themselves, get them help right away.  How does counseling work? -- A counselor can help you figure out:  ?What triggers you to want to smoke, and how to handle these situations  ?How to resist cravings  ?What you can do differently if you have tried to quit before  You can meet with a counselor in 1-on-1 sessions or as part of a group. There are other ways to get counseling, too, such as over the phone, through text messaging, or online.  How do medicines help you stop smoking? -- Different medicines work in different ways:  ?Nicotine replacement therapy - Nicotine is the main drug in cigarettes, and the reason they are addictive. These medicines reduce your body's craving for nicotine. They also help with withdrawal symptoms.  There are different forms of nicotine replacement, including skin  "patches, lozenges, gum, nasal sprays, and inhalers. Most can be bought without a prescription. Also, health insurance might cover some or all of the cost.  It often helps to use 2 forms of nicotine replacement. For example, you might wear a patch all the time, plus use gum or lozenges when you get a craving to smoke.  ?Varenicline - Varenicline (brand names: Chantix, Champix) is a prescription medicine that reduces withdrawal symptoms and cigarette cravings. Varenicline can increase the effects of alcohol in some people. It's a good idea to limit drinking while you're taking it, at least until you know how it affects you.  Even if you are not yet ready to commit to a quit date, varenicline can help reduce cravings. This can make it easier to quit when you are ready.  ?Bupropion - Bupropion (sample brand names: Wellbutrin, Zyban) is a prescription medicine that reduces your desire to smoke. It is also available in a generic version, which is cheaper than the brand name medicines. Doctors do not usually prescribe bupropion for people with seizures or who have had seizures in the past.  It might also be helpful to combine nicotine replacement with bupropion or varenicline. In some cases, a person might even take both bupropion and varenicline. Your doctor or nurse can help you figure out the best combination for you.  What about e-cigarettes? -- Sometimes people wonder if using electronic cigarettes, or \"e-cigarettes,\" can help them quit smoking. Using e-cigarettes is also called \"vaping.\" Doctors do not recommend e-cigarettes in place of using of medicines and counseling. That's because e-cigarettes still contain nicotine as well as other substances that might be harmful. It's also not clear how they can affect a person's health in the long term.  What if I am pregnant and I smoke? -- If you are pregnant, it's really important for the health of your baby that you quit. Ask your doctor what options you have, and what " is safest for your baby.  What if I have already smoked for a long time? -- The longer you have smoked, the higher your chances are of having health problems. But it is never too late to quit smoking. It helps your health even if you are older or have smoked for many years. The best thing you can do to lower your chance of having a health problem caused by smoking is to quit.  Will I gain weight if I quit? -- You might gain a few pounds. This can be frustrating for some people. But it's important to remember that you are improving your health by quitting smoking. You can help prevent gaining a lot of weight by staying active and eating a healthy diet.  What if I am not able to quit? -- If you don't quit on your first try, or if you quit but then start smoking again, don't give up hope. Lots of people have to try more than once before they are able to completely quit.  It might help to try to understand why quitting did not work. There might be something you can do differently when you try again. It can help to figure out which situations make you want to smoke, so you can avoid them.  What else can I do to improve my chances of quitting? -- You can:  ?Get regular exercise. Any type of physical activity, even gentle forms of movement, is good for your health. Physical activity can also help reduce stress.  ?Stay away from people who smoke and places that make you want to smoke. If people close to you smoke, ask them to quit with you or avoid smoking around you.  ?Carry gum, hard candy, or something to put in your mouth. If you get a craving for a cigarette, try 1 of these instead.  ?Don't give up, even if you start smoking again. It takes most people a few tries before they succeed.  All topics are updated as new evidence becomes available and our peer review process is complete.

## 2024-04-22 ENCOUNTER — OFFICE VISIT (OUTPATIENT)
Dept: CARDIOLOGY | Facility: CLINIC | Age: 49
End: 2024-04-22
Payer: COMMERCIAL

## 2024-04-22 VITALS
SYSTOLIC BLOOD PRESSURE: 112 MMHG | DIASTOLIC BLOOD PRESSURE: 60 MMHG | WEIGHT: 208 LBS | HEART RATE: 71 BPM | BODY MASS INDEX: 35.51 KG/M2 | HEIGHT: 64 IN | OXYGEN SATURATION: 97 %

## 2024-04-22 DIAGNOSIS — R94.31 ABNORMAL EKG: ICD-10-CM

## 2024-04-22 PROCEDURE — 3074F SYST BP LT 130 MM HG: CPT | Performed by: INTERNAL MEDICINE

## 2024-04-22 PROCEDURE — 3078F DIAST BP <80 MM HG: CPT | Performed by: INTERNAL MEDICINE

## 2024-04-22 PROCEDURE — 99213 OFFICE O/P EST LOW 20 MIN: CPT | Performed by: INTERNAL MEDICINE

## 2024-04-22 NOTE — LETTER
April 22, 2024     Perri Reno MD  [address]  [address]  [facility] [address]    Patient: Cb Olivia   YOB: 1975   Date of Visit: 4/22/2024       Dear Dr. Perri Reno MD:    Thank you for referring Cb Olivia to me for evaluation. Below are my notes for this consultation.  If you have questions, please do not hesitate to call me. I look forward to following your patient along with you.       Sincerely,     Konstantin Mitchell MD      CC: No Recipients  ______________________________________________________________________________________    Counseling:  The patient was counseled regarding diagnostic results, instructions for management, risk factor reductions, prognosis, patient and family education, impressions, risks and benefits of treatment options and importance of compliance with treatment.      Chief Complain:  The patient presents today for 1-month followup of chest pain s/p stress echo and LHC.     History Of Present Illness:    Cb Olivia is a 48 y.o. male patient who presents today for 1-month followup of chest pain s/p stress echo and LHC. His PMH is significant for CAD with h/o MI in 2018 and 2019 s/t PCI of Dg and LAD respectively. Exercise stress echo performed 04/01/2024 was abnormal d/t mildly decreased global LV systolic function and baseline wall motion abnormalities which persisted/worsened during the stress period. Based on these findings, the patient was taken to the cath lab on 04/16/2024, which revealed a patent LAD stent with severe proximal 3rd diagonal disease not ammenable for revascularization, 30-40% proximal to mid RCA disease, mild disease of the LCx, and 20-30% distal left main disease. Today, the patient states that he is feeling well, having recovered from LHC. He remains compliant with his prescribed medications.     Past Surgical History:  He has a past surgical history that includes Cardiac catheterization (N/A, 4/16/2024).      Social  "History:  He reports that he has never smoked. He uses smokeless tobacco. He reports that he does not currently use alcohol. He reports that he does not use drugs.    Family History:  No family history on file.     Allergies:  Patient has no known allergies.    Outpatient Medications:  Current Outpatient Medications   Medication Instructions   • amLODIPine (NORVASC) 10 mg, oral, Daily   • aspirin 81 mg, oral, Daily   • atorvastatin (LIPITOR) 80 mg, oral, Daily   • clopidogrel (PLAVIX) 75 mg, oral, Daily   • hydroCHLOROthiazide (HYDRODIURIL) 25 mg, oral, Daily   • lisinopril 40 mg, oral, Daily   • metFORMIN (GLUCOPHAGE) 500 mg, oral   • metoprolol succinate XL (TOPROL-XL) 50 mg, oral, Nightly        Last Recorded Vitals:  Vitals:    04/22/24 1526   BP: 112/60   BP Location: Left arm   Pulse: 71   SpO2: 97%   Weight: 94.3 kg (208 lb)   Height: 1.626 m (5' 4\")         Review of Systems   Cardiovascular:  Positive for chest pain.   All other systems reviewed and are negative.     Physical Exam:  Constitutional:       Appearance: Healthy appearance. Not in distress.   Neck:      Vascular: No JVR. JVD normal.   Pulmonary:      Effort: Pulmonary effort is normal.      Breath sounds: Normal breath sounds. No wheezing. No rhonchi. No rales.   Chest:      Chest wall: Not tender to palpatation.   Cardiovascular:      PMI at left midclavicular line. Normal rate. Regular rhythm. Normal S1. Normal S2.       Murmurs: There is no murmur.      No gallop.  No click. No rub.   Pulses:     Intact distal pulses.   Edema:     Peripheral edema absent.   Abdominal:      General: Bowel sounds are normal.      Palpations: Abdomen is soft.      Tenderness: There is no abdominal tenderness.   Musculoskeletal: Normal range of motion.         General: No tenderness. Skin:     General: Skin is warm and dry.   Neurological:      General: No focal deficit present.      Mental Status: Alert and oriented to person, place and time.        Last " Cardiology Tests:  04/16/2024 - Cardiac Catheterization (LH)  1. Left heart cath revealed patent LAD stent with severe proximal 3rd diagonal disease (not ammenable for revasc).  2. RCA has 30-40% proximal to mid.  3. LCX has mild disease.  4. Distal left main has 20-30%.  5. Agrressive medical therapy.  6. LVEDP 11mmHg, no gradient at pullback.    04/01/2024 - Exercise Stress Echo  1. Abnormal Stress Test.  2. Adequate level of stress achieved.  3. No ischemia by ECG at max workload.  4. No clinical or electrocardiographic evidence for ischemia at maximal workload.  5. Mildly decreased global left ventricular systolic function.  6. Stage 1: Rest: Multiple segmental abnormalities exist. See findings. Stage 2: Impost: Multiple segmental abnormalities exist. See findings.  7. There are baseline wall motion abnormalities in the apical, inferior septum, distal anterior septum, and distal anterior wall which persisted/worsened during the stress period. There was slight improvement in ejection fraction and reduction in cavity size at peak stress.    Diagnostic review: I have personally reviewed the result(s) of the Stress Test and LHC.     Assessment/Plan  1) Chest Pain in a Patient with h/o CAD s/p PCI of Dg and LAD  On DAPT with ASA 81 mg daily and Plavix 75 mg daily  On amlodipine 10 mg daily, atorvastatin 80 mg daily, HCTZ 25 mg daily, lisinopril 40 mg daily, metoprolol succinate 50 mg daily.  H/O MI in 2018 and 2019 s/t PCI of Dg and LAD respectively  ED evaluation at [place] 02/27/2024 - negative workup, discharged home  Reports persistent chest pain with radiation to left arm  Denies any significant exertional SOB  Exercise stress echo 04/01/2024 abnormal d/t mildly decreased global LV systolic function and baseline wall motion abnormalities which persisted/worsened during the stress period.  LHC 04/16/2024 with patent LAD stent with severe proximal 3rd diagonal disease not ammenable for revascularization, 30-40%  proximal to mid RCA disease, mild disease of the LCx, and 20-30% distal left main disease.  Recovered well from [facility]  Advised on aggressive secondary risk factor modification  Followup with Mary Sheikh NP, in 6 months        Scribe Attestation  By signing my name below, I, Soni Aguirre, Cdaen   attest that this documentation has been prepared under the direction and in the presence of Konstantin Mitchell MD.

## 2024-04-22 NOTE — PATIENT INSTRUCTIONS
Continue all current medications as prescribed.   To prevent any future progression of any cholesterol plaque seen on your heart catheterization, it is important to monitor and manage your cholesterol through diet, exercise and medication compliance. Watch carbohydrate intake (rice, potatoes, pasta, bread, ice-cream all within moderation); increase greens, veggies, fiber, lean protein (fish, turkey, chicken; baked/boiled/grilled, not fried) and fruit. Dr. Mitchell has recommended performing extra physical activity outside of your work related activity.   Followup with Mary Sheikh NP, in 6 months.      If you have any questions or cardiac concerns, please call our office at [phone].

## 2024-04-22 NOTE — PROGRESS NOTES
Counseling:  The patient was counseled regarding diagnostic results, instructions for management, risk factor reductions, prognosis, patient and family education, impressions, risks and benefits of treatment options and importance of compliance with treatment.      Chief Complain:  The patient presents today for 1-month followup of chest pain s/p stress echo and [facility].     History Of Present Illness:    Cb Olivia is a 48 y.o. male patient who presents today for 1-month followup of chest pain s/p stress echo and [facility]. His PMH is significant for CAD with h/o MI in 2018 and 2019 s/t PCI of Dg and LAD respectively. Exercise stress echo performed 04/01/2024 was abnormal d/t mildly decreased global LV systolic function and baseline wall motion abnormalities which persisted/worsened during the stress period. Based on these findings, the patient was taken to the cath lab on 04/16/2024, which revealed a patent LAD stent with severe proximal 3rd diagonal disease not ammenable for revascularization, 30-40% proximal to mid RCA disease, mild disease of the LCx, and 20-30% distal left main disease. Today, the patient states that he is feeling well, having recovered from [facility]. He remains compliant with his prescribed medications.     Past Surgical History:  He has a past surgical history that includes Cardiac catheterization (N/A, 4/16/2024).      Social History:  He reports that he has never smoked. He uses smokeless tobacco. He reports that he does not currently use alcohol. He reports that he does not use drugs.    Family History:  No family history on file.     Allergies:  Patient has no known allergies.    Outpatient Medications:  Current Outpatient Medications   Medication Instructions    amLODIPine (NORVASC) 10 mg, oral, Daily    aspirin 81 mg, oral, Daily    atorvastatin (LIPITOR) 80 mg, oral, Daily    clopidogrel (PLAVIX) 75 mg, oral, Daily    hydroCHLOROthiazide (HYDRODIURIL) 25 mg, oral, Daily    lisinopril 40 mg,  "oral, Daily    metFORMIN (GLUCOPHAGE) 500 mg, oral    metoprolol succinate XL (TOPROL-XL) 50 mg, oral, Nightly        Last Recorded Vitals:  Vitals:    04/22/24 1526   BP: 112/60   BP Location: Left arm   Pulse: 71   SpO2: 97%   Weight: 94.3 kg (208 lb)   Height: 1.626 m (5' 4\")         Review of Systems   Cardiovascular:  Positive for chest pain.   All other systems reviewed and are negative.     Physical Exam:  Constitutional:       Appearance: Healthy appearance. Not in distress.   Neck:      Vascular: No JVR. JVD normal.   Pulmonary:      Effort: Pulmonary effort is normal.      Breath sounds: Normal breath sounds. No wheezing. No rhonchi. No rales.   Chest:      Chest wall: Not tender to palpatation.   Cardiovascular:      PMI at left midclavicular line. Normal rate. Regular rhythm. Normal S1. Normal S2.       Murmurs: There is no murmur.      No gallop.  No click. No rub.   Pulses:     Intact distal pulses.   Edema:     Peripheral edema absent.   Abdominal:      General: Bowel sounds are normal.      Palpations: Abdomen is soft.      Tenderness: There is no abdominal tenderness.   Musculoskeletal: Normal range of motion.         General: No tenderness. Skin:     General: Skin is warm and dry.   Neurological:      General: No focal deficit present.      Mental Status: Alert and oriented to person, place and time.        Last Cardiology Tests:  04/16/2024 - Cardiac Catheterization (LH)  1. Left heart cath revealed patent LAD stent with severe proximal 3rd diagonal disease (not ammenable for revasc).  2. RCA has 30-40% proximal to mid.  3. LCX has mild disease.  4. Distal left main has 20-30%.  5. Agrressive medical therapy.  6. LVEDP 11mmHg, no gradient at pullback.    04/01/2024 - Exercise Stress Echo  1. Abnormal Stress Test.  2. Adequate level of stress achieved.  3. No ischemia by ECG at max workload.  4. No clinical or electrocardiographic evidence for ischemia at maximal workload.  5. Mildly decreased " global left ventricular systolic function.  6. Stage 1: Rest: Multiple segmental abnormalities exist. See findings. Stage 2: Impost: Multiple segmental abnormalities exist. See findings.  7. There are baseline wall motion abnormalities in the apical, inferior septum, distal anterior septum, and distal anterior wall which persisted/worsened during the stress period. There was slight improvement in ejection fraction and reduction in cavity size at peak stress.    Diagnostic review: I have personally reviewed the result(s) of the Stress Test and [facility].     Assessment/Plan   1) Chest Pain in a Patient with h/o CAD s/p PCI of Dg and LAD  On DAPT with ASA 81 mg daily and Plavix 75 mg daily  On amlodipine 10 mg daily, atorvastatin 80 mg daily, HCTZ 25 mg daily, lisinopril 40 mg daily, metoprolol succinate 50 mg daily.  H/O MI in 2018 and 2019 s/t PCI of Dg and LAD respectively  ED evaluation at [place] 02/27/2024 - negative workup, discharged home  Reports persistent chest pain with radiation to left arm  Denies any significant exertional SOB  Exercise stress echo 04/01/2024 abnormal d/t mildly decreased global LV systolic function and baseline wall motion abnormalities which persisted/worsened during the stress period.  [facility] 04/16/2024 with patent LAD stent with severe proximal 3rd diagonal disease not ammenable for revascularization, 30-40% proximal to mid RCA disease, mild disease of the LCx, and 20-30% distal left main disease.  Recovered well from [facility]  Advised on aggressive secondary risk factor modification  Followup with Mary Sheikh NP, in 6 months        Scribe Attestation  By signing my name below, I, Caden Franz   attest that this documentation has been prepared under the direction and in the presence of Konstantin Mitchell MD.

## 2024-10-29 ENCOUNTER — APPOINTMENT (OUTPATIENT)
Dept: CARDIOLOGY | Facility: CLINIC | Age: 49
End: 2024-10-29
Payer: COMMERCIAL

## 2025-03-16 SDOH — HEALTH STABILITY: PHYSICAL HEALTH: ON AVERAGE, HOW MANY MINUTES DO YOU ENGAGE IN EXERCISE AT THIS LEVEL?: 60 MIN

## 2025-03-16 SDOH — HEALTH STABILITY: PHYSICAL HEALTH: ON AVERAGE, HOW MANY DAYS PER WEEK DO YOU ENGAGE IN MODERATE TO STRENUOUS EXERCISE (LIKE A BRISK WALK)?: 5 DAYS

## 2025-03-17 ENCOUNTER — OFFICE VISIT (OUTPATIENT)
Dept: ORTHOPEDIC SURGERY | Age: 50
End: 2025-03-17
Payer: COMMERCIAL

## 2025-03-17 VITALS — TEMPERATURE: 98 F | BODY MASS INDEX: 34.83 KG/M2 | HEIGHT: 64 IN | WEIGHT: 204 LBS

## 2025-03-17 DIAGNOSIS — M25.561 ACUTE PAIN OF RIGHT KNEE: Primary | ICD-10-CM

## 2025-03-17 PROCEDURE — 99203 OFFICE O/P NEW LOW 30 MIN: CPT | Performed by: ORTHOPAEDIC SURGERY

## 2025-03-17 RX ORDER — PREDNISONE 10 MG/1
TABLET ORAL
COMMUNITY
Start: 2025-03-13

## 2025-03-17 NOTE — PROGRESS NOTES
no consequence to his joint.  He has very slight squaring of the margins of the medial femoral-tibial condyles.    ASSESSMENT:  Jose Antonio was seen today for knee pain.    Diagnoses and all orders for this visit:    Right knee pain, unspecified chronicity  -     XR KNEE RIGHT (1-2 VIEWS); Future    Acute pain of right knee     Treatment alternatives were reviewed including medical and physical therapies, injections, and surgical options, expected risks benefits and likely outcome of each were discussed in detail, questions asked and answered and understood.  We discussed the symptoms as well as physical findings and imaging results.  At present his symptoms have decreased.  His knee joint is stable.  There is no indication for aggressive treatment now    PLAN: Wean the knee sleeve at his discretion.  Oral anti-inflammatories as needed for discomfort.  Will follow-up relative to symptoms.        Patient Active Problem List   Diagnosis    C7 cervical fracture (HCC) - bilateral lamina    Hypertension       Past Medical History:   Diagnosis Date    Hypertension 6/24/2014       Past Surgical History:   Procedure Laterality Date    OTHER SURGICAL HISTORY  6/25/14    ACF C5-C6, C6-C7       Current Outpatient Medications   Medication Sig Dispense Refill    predniSONE (DELTASONE) 10 MG tablet TAKE 3 TABS DAILY FOR 3 DAYS, TAKE 2 TABS DAILY FOR 3 DAYS, TAKE 1 TAB DAILY X 3 DAYS, TAKE 1/2 TAB UNTIL GONE      atorvastatin (LIPITOR) 80 MG tablet take 1 tablet by mouth once daily      metoprolol succinate (TOPROL XL) 50 MG extended release tablet take 1 tablet by mouth every evening      clopidogrel (PLAVIX) 75 MG tablet take 1 tablet by mouth once daily      quinapril (ACCUPRIL) 40 MG tablet take 1 tablet by mouth once daily      hydroCHLOROthiazide (HYDRODIURIL) 25 MG tablet take 1 tablet by mouth once daily      metFORMIN (GLUCOPHAGE) 500 MG tablet take 1 tablet by mouth once daily      RA ASPIRIN EC 81 MG EC tablet take 1 tablet

## 2025-07-09 ENCOUNTER — OFFICE VISIT (OUTPATIENT)
Dept: VASCULAR SURGERY | Facility: HOSPITAL | Age: 50
End: 2025-07-09
Payer: COMMERCIAL

## 2025-07-09 VITALS
SYSTOLIC BLOOD PRESSURE: 101 MMHG | WEIGHT: 194 LBS | DIASTOLIC BLOOD PRESSURE: 67 MMHG | HEART RATE: 64 BPM | BODY MASS INDEX: 33.3 KG/M2

## 2025-07-09 DIAGNOSIS — I73.9 PVD (PERIPHERAL VASCULAR DISEASE): ICD-10-CM

## 2025-07-09 DIAGNOSIS — R20.0 LEFT LEG NUMBNESS: Primary | ICD-10-CM

## 2025-07-09 PROCEDURE — 3074F SYST BP LT 130 MM HG: CPT | Performed by: PHYSICIAN ASSISTANT

## 2025-07-09 PROCEDURE — 99214 OFFICE O/P EST MOD 30 MIN: CPT | Performed by: PHYSICIAN ASSISTANT

## 2025-07-09 PROCEDURE — 3078F DIAST BP <80 MM HG: CPT | Performed by: PHYSICIAN ASSISTANT

## 2025-07-09 PROCEDURE — 1036F TOBACCO NON-USER: CPT | Performed by: PHYSICIAN ASSISTANT

## 2025-07-09 PROCEDURE — 99204 OFFICE O/P NEW MOD 45 MIN: CPT | Performed by: PHYSICIAN ASSISTANT

## 2025-07-09 RX ORDER — SEMAGLUTIDE 1.34 MG/ML
INJECTION, SOLUTION SUBCUTANEOUS WEEKLY
COMMUNITY

## 2025-07-09 ASSESSMENT — ENCOUNTER SYMPTOMS
SHORTNESS OF BREATH: 0
JOINT SWELLING: 0
SPEECH DIFFICULTY: 0
ADENOPATHY: 0
NUMBNESS: 1
SEIZURES: 0
COLOR CHANGE: 0
HEADACHES: 0
ARTHRALGIAS: 0
WOUND: 0
LIGHT-HEADEDNESS: 0
ABDOMINAL PAIN: 0
VOMITING: 0
FACIAL ASYMMETRY: 0
FEVER: 0
PALPITATIONS: 0
COUGH: 0
CONFUSION: 0
TROUBLE SWALLOWING: 0
FATIGUE: 0
CHILLS: 0
WHEEZING: 0
DIARRHEA: 0
WEAKNESS: 0
CONSTIPATION: 0
SLEEP DISTURBANCE: 0
DIFFICULTY URINATING: 0
NAUSEA: 0
DIZZINESS: 0
NECK PAIN: 0
SORE THROAT: 0

## 2025-07-09 NOTE — PROGRESS NOTES
Subjective   Patient ID: Cb Olivia is a 50 y.o. male who presents for New Patient Visit (NPV- numbness in left leg/Ref from Torri Pittman).  HPI  50 year old male with past medical history of CAD s/p cardiac stenting, HTN, C7 cervical fracture s/p surgery presents as a  new patient for evaluation of left lower extremity intermittent numbness. Reports has been present for about 1 year. No history of trauma or surgery. Located on front/medial thigh. Does not travel down the leg. Worse or persistent with prolonged standing or walking. No thigh cramping no associated pain. More annoying per patient. No calf claudication. No rest pain. No wounds or ulcerations. Patient thinks it may be coming from his low back. Has not seen ortho spine in the past for evaluation. PCP did arterial LLE US in office (results not available for review), which per patient revealed ' narrowing in artery in his left leg'. Patient is fully ambulatory and active. No associated swelling.     Medical History[1]   Surgical History[2]   Social History     Socioeconomic History    Marital status:      Spouse name: Not on file    Number of children: Not on file    Years of education: Not on file    Highest education level: Not on file   Occupational History    Not on file   Tobacco Use    Smoking status: Former     Current packs/day: 0.00     Average packs/day: 1 pack/day for 30.0 years (30.0 ttl pk-yrs)     Types: Cigarettes     Start date: 1988     Quit date: 2018     Years since quittin.5    Smokeless tobacco: Former     Types: Snuff     Quit date: 2018    Tobacco comments:     vape   Substance and Sexual Activity    Alcohol use: Not Currently    Drug use: Not Currently     Types: Marijuana     Comment: 2-3 times a year. 0 so far this year    Sexual activity: Yes     Partners: Female     Birth control/protection: Male Sterilization, Female Sterilization   Other Topics Concern    Not on file   Social History Narrative     Not on file     Social Drivers of Health     Financial Resource Strain: Not on file   Food Insecurity: Not on file   Transportation Needs: Not on file   Physical Activity: Sufficiently Active (3/16/2025)    Received from [facility]    Exercise Vital Sign     Days of Exercise per Week: 5 days     Minutes of Exercise per Session: 60 min   Stress: Not on file   Social Connections: Not on file   Intimate Partner Violence: Not on file   Housing Stability: Not on file    Family History[3]   RX Allergies[4]   Current Outpatient Medications   Medication Instructions    amLODIPine (NORVASC) 10 mg, Daily    aspirin 81 mg, Daily    atorvastatin (LIPITOR) 80 mg, Daily    clopidogrel (PLAVIX) 75 mg, Daily    hydroCHLOROthiazide (HYDRODIURIL) 25 mg, Daily    lisinopril 40 mg, Daily    metFORMIN (GLUCOPHAGE) 500 mg, oral    metoprolol succinate XL (TOPROL-XL) 50 mg, Nightly    semaglutide (Ozempic) 0.25 mg or 0.5 mg(2 mg/1.5 mL) pen injector Weekly      Review of Systems   Constitutional:  Negative for chills, fatigue and fever.   HENT:  Negative for congestion, sore throat and trouble swallowing.    Eyes:  Negative for visual disturbance.   Respiratory:  Negative for cough, shortness of breath and wheezing.    Cardiovascular:  Negative for chest pain, palpitations and leg swelling.   Gastrointestinal:  Negative for abdominal pain, constipation, diarrhea, nausea and vomiting.   Endocrine: Negative for cold intolerance and heat intolerance.   Genitourinary:  Negative for difficulty urinating.   Musculoskeletal:  Negative for arthralgias, joint swelling and neck pain.   Skin:  Negative for color change and wound.   Neurological:  Positive for numbness. Negative for dizziness, seizures, syncope, facial asymmetry, speech difficulty, weakness, light-headedness and headaches.   Hematological:  Negative for adenopathy.   Psychiatric/Behavioral:  Negative for behavioral problems, confusion and sleep disturbance.       Vitals:    07/09/25 1438   BP: 101/67   Pulse: 64   Weight: 88 kg (194 lb)      Objective   Physical Exam  Constitutional:       Appearance: Normal appearance.   HENT:      Head: Normocephalic.      Right Ear: External ear normal.      Left Ear: External ear normal.      Nose: Nose normal.      Mouth/Throat:      Mouth: Mucous membranes are moist.   Eyes:      Extraocular Movements: Extraocular movements intact.   Neck:      Vascular: No carotid bruit.   Cardiovascular:      Rate and Rhythm: Normal rate and regular rhythm.      Pulses: Normal pulses.   Pulmonary:      Effort: Pulmonary effort is normal. No respiratory distress.      Breath sounds: Normal breath sounds.   Abdominal:      Palpations: Abdomen is soft.      Tenderness: There is no abdominal tenderness.   Musculoskeletal:         General: No swelling or tenderness.      Cervical back: Normal range of motion and neck supple.      Right lower leg: No edema.      Left lower leg: No edema.   Skin:     General: Skin is warm and dry.      Capillary Refill: Capillary refill takes less than 2 seconds.      Findings: No lesion.   Neurological:      General: No focal deficit present.      Mental Status: He is alert and oriented to person, place, and time. Mental status is at baseline.   Psychiatric:         Mood and Affect: Mood normal.         Behavior: Behavior normal.         Thought Content: Thought content normal.         Judgment: Judgment normal.         Assessment/Plan   Problem List Items Addressed This Visit    None  Visit Diagnoses         Codes      Left leg numbness    -  Primary R20.0    Relevant Orders    Vascular US PVR With Exercise    Referral to Adult Orthopedics and Sports Medicine      PVD (peripheral vascular disease)     I73.9    Relevant Orders    Vascular US PVR With Exercise    Referral to Adult Orthopedics and Sports Medicine        50 year old male with past medical history of CAD s/p cardiac stenting, HTN, C7 cervical fracture s/p  surgery presents as a  new patient for evaluation of left lower extremity intermittent numbness.   -will obtain formal PVR/ROYCE testing, will call with results   -will obtain records from PCP office  -suspect etiology of LLE numbness may be spine related  -will refer to ortho spine for evaluation as well  -continue regular walking regimen  -further recommendations pending testing  -patient to call with new or worsening symptoms, questions or concerns          Suki Quiroga PA-C 07/09/25 2:41 PM        [1] No past medical history on file.  [2]   Past Surgical History:  Procedure Laterality Date    CARDIAC CATHETERIZATION N/A 4/16/2024    Procedure: Left Heart Cath;  Surgeon: Konstantin Mitchell MD;  Location: [facility] Cardiac Cath Lab;  Service: Cardiovascular;  Laterality: N/A;   [3]   Family History  Problem Relation Name Age of Onset    Hypertension Father Rowdy sr 40 - 49   [4] No Known Allergies

## 2025-09-02 ENCOUNTER — HOSPITAL ENCOUNTER (OUTPATIENT)
Dept: VASCULAR MEDICINE | Facility: HOSPITAL | Age: 50
Discharge: HOME | End: 2025-09-02
Payer: COMMERCIAL

## 2025-09-02 DIAGNOSIS — R20.0 LEFT LEG NUMBNESS: ICD-10-CM

## 2025-09-02 DIAGNOSIS — I73.9 PVD (PERIPHERAL VASCULAR DISEASE): ICD-10-CM

## 2025-09-02 PROCEDURE — 93924 LWR XTR VASC STDY BILAT: CPT

## 2025-09-02 PROCEDURE — 93924 LWR XTR VASC STDY BILAT: CPT | Performed by: SURGERY

## 2025-09-11 ENCOUNTER — APPOINTMENT (OUTPATIENT)
Facility: HOSPITAL | Age: 50
End: 2025-09-11
Payer: COMMERCIAL

## (undated) DEVICE — CATHETER, OPTITORQUE, 6FR, JACKY, BL3.5/2H/100CM

## (undated) DEVICE — CATHETER, DIAGNOSTIC, DXTERITY, 6FR 100CM, JL35

## (undated) DEVICE — TR BAND, RADIAL COMPRESSION, STANDARD, 24CM

## (undated) DEVICE — GUIDEWIRE, INQUIRE, J TIP, .035 X 210CM, FIXED CORE, DIAGNOSTIC

## (undated) DEVICE — SHEATH, GLIDESHEATH, SLENDER, 6FR 10CM